# Patient Record
Sex: MALE | Race: WHITE | Employment: UNEMPLOYED | URBAN - METROPOLITAN AREA
[De-identification: names, ages, dates, MRNs, and addresses within clinical notes are randomized per-mention and may not be internally consistent; named-entity substitution may affect disease eponyms.]

---

## 2022-04-26 ENCOUNTER — TELEPHONE (OUTPATIENT)
Dept: BARIATRICS/WEIGHT MGMT | Age: 77
End: 2022-04-26

## 2022-04-26 NOTE — TELEPHONE ENCOUNTER
Just an FYI. .. We received a referral from Dr Tammy Alexander. We have attempted to contact pt 2x, have left messages for him to call office. .Pt does not have any other contacts listing in his chart.  Unable to reach him

## 2022-04-28 ENCOUNTER — INITIAL CONSULT (OUTPATIENT)
Dept: BARIATRICS/WEIGHT MGMT | Age: 77
End: 2022-04-28
Payer: MEDICARE

## 2022-04-28 VITALS
HEART RATE: 89 BPM | SYSTOLIC BLOOD PRESSURE: 149 MMHG | DIASTOLIC BLOOD PRESSURE: 77 MMHG | BODY MASS INDEX: 26.16 KG/M2 | WEIGHT: 157 LBS | HEIGHT: 65 IN

## 2022-04-28 DIAGNOSIS — Z99.2 ESRD (END STAGE RENAL DISEASE) ON DIALYSIS (HCC): Primary | ICD-10-CM

## 2022-04-28 DIAGNOSIS — N18.6 ESRD (END STAGE RENAL DISEASE) ON DIALYSIS (HCC): Primary | ICD-10-CM

## 2022-04-28 PROCEDURE — 99204 OFFICE O/P NEW MOD 45 MIN: CPT | Performed by: SURGERY

## 2022-04-28 PROCEDURE — 1123F ACP DISCUSS/DSCN MKR DOCD: CPT | Performed by: SURGERY

## 2022-04-28 PROCEDURE — 1036F TOBACCO NON-USER: CPT | Performed by: SURGERY

## 2022-04-28 PROCEDURE — G8417 CALC BMI ABV UP PARAM F/U: HCPCS | Performed by: SURGERY

## 2022-04-28 PROCEDURE — 3017F COLORECTAL CA SCREEN DOC REV: CPT | Performed by: SURGERY

## 2022-04-28 PROCEDURE — G8427 DOCREV CUR MEDS BY ELIG CLIN: HCPCS | Performed by: SURGERY

## 2022-04-28 PROCEDURE — 4040F PNEUMOC VAC/ADMIN/RCVD: CPT | Performed by: SURGERY

## 2022-04-28 NOTE — PROGRESS NOTES
[facility]) Physicians   General & Laparoscopic Surgery  [employer]       HPI:  Abdelrahman Garland is a very pleasant 77 y.o. male who is referred by Dr. Gin Browning nephrologist for consultation with regards PD catheter placement    Patient has ESRD requiring dialysis and prefers PD over HD    History of kidney transplant 8 years ago, has been on HD for last year      Past Medical History:   Diagnosis Date    Cancer [facility])     prostate    Chronic kidney disease     Hyperlipidemia     Hypertension, essential      Past Surgical History:   Procedure Laterality Date    CATARACT REMOVAL      HIP SURGERY      KIDNEY TRANSPLANT      PROSTATE SURGERY      TONSILLECTOMY      WISDOM TOOTH EXTRACTION       History reviewed. No pertinent family history. Social History     Tobacco Use    Smoking status: Former Smoker    Smokeless tobacco: Never Used   Substance Use Topics    Alcohol use: Never     I counseled the patient on the importance of not smoking and risks of ETOH. No Known Allergies  Vitals:    04/28/22 1029   BP: (!) 149/77   Pulse: 89   Weight: 157 lb (71.2 kg)   Height: 5' 5\" (1.651 m)       Body mass index is 26.13 kg/m².       Current Outpatient Medications:     Tacrolimus (PROGRAF) 0.2 MG PACK, Take by mouth, Disp: , Rfl:     escitalopram (LEXAPRO) 20 MG tablet, Take 20 mg by mouth daily, Disp: , Rfl:     atorvastatin (LIPITOR) 40 MG tablet, Take 40 mg by mouth daily, Disp: , Rfl:     carvedilol (COREG) 6.25 MG tablet, Take 6.25 mg by mouth 2 times daily (with meals), Disp: , Rfl:     sevelamer (RENVELA) 800 MG tablet, Take 1 tablet by mouth 2 times daily , Disp: , Rfl:     calcium carbonate (TUMS) 500 MG chewable tablet, Take 1 tablet by mouth daily, Disp: , Rfl:     predniSONE (DELTASONE) 2.5 MG tablet, Take 1 tablet by mouth daily (Patient taking differently: Take 2.5 mg by mouth daily Half pill day), Disp: 30 tablet, Rfl: 5    ROS  Review of Systems - History obtained from the patient  General ROS: negative  Psychological ROS: negative  Ophthalmic ROS: negative  Neurological ROS: negative  ENT ROS: negative  Allergy and Immunology ROS: negative  Hematological and Lymphatic ROS: negative  Endocrine ROS: negative  Respiratory ROS: negative  Cardiovascular ROS: negative  Gastrointestinal ROS:negative  Genito-Urinary ROS: negative  Musculoskeletal ROS: negative   Skin ROS:negative        Physical Exam   Constitutional: Patient is oriented to person, place, and time. Vital signs are normal. Patient  appears well-developed and well-nourished. Patient  is active and cooperative. Non-toxic appearance. No distress. HENT:   Head: Normocephalic and atraumatic. Head is without laceration. Right Ear: External ear normal. No lacerations. No drainage, swelling or tenderness. Left Ear: External ear normal. No lacerations. No drainage, swelling or tenderness. Nose: Nose normal. No nose lacerations or nasal deformity. Mouth/Throat: Uvula is midline, oropharynx is clear and moist and mucous membranes are normal. No oropharyngeal exudate. Eyes: Conjunctivae, EOM and lids are normal. Pupils are equal, round, and reactive to light. Right eye exhibits no discharge. No foreign body present in the right eye. Left eye exhibits no discharge. No foreign body present in the left eye. No scleral icterus. Neck: Trachea normal and normal range of motion. Neck supple. No JVD present. No tracheal tenderness present. Carotid bruit is not present. No rigidity. No tracheal deviation and no edema present. No thyromegaly present. Cardiovascular: Normal rate, regular rhythm, normal heart sounds, intact distal pulses and normal pulses. Pulmonary/Chest: Effort normal and breath sounds normal. No stridor. No respiratory distress. Patient  has no wheezes. Patient has no rales. Patient exhibits no tenderness and no crepitus. Abdominal: Soft.  Normal appearance and bowel sounds are normal. Patient exhibits no distension, no abdominal bruit, no ascites and no mass. There is no hepatosplenomegaly. There is no tenderness. There is no rigidity, no rebound, no guarding and no CVA tenderness. No hernia. Hernia confirmed negative in the ventral area. Musculoskeletal: Normal range of motion. Patient exhibits no edema or tenderness. Lymphadenopathy:        Head (right side): No submental, no submandibular, no preauricular, no posterior auricular and no occipital adenopathy present. Head (left side): No submental, no submandibular, no preauricular, no posterior auricular and no occipital adenopathy present. Patient  has no cervical adenopathy. Right: No supraclavicular adenopathy present. Left: No supraclavicular adenopathy present. Neurological: Patient is alert and oriented to person, place, and time. Patient has normal strength. Coordination and gait normal. GCS eye subscore is 4. GCS verbal subscore is 5. GCS motor subscore is 6. Skin: Skin is warm and dry. No abrasion and no rash noted. Patient  is not diaphoretic. No cyanosis or erythema. Psychiatric: Patient has a normal mood and affect. speech is normal and behavior is normal. Cognition and memory are normal.       Radiology:  I have personally reviewed the radiological studies and discussed it with the patient. A/P:  Adama Hrarington is a very pleasant 77 y.o. male with symptomatic CKD stage 5 requiring dialysis    Patient prefers Lap PD cath placement    Risks and benefits explained and informed consent obtained. Risks include but not limited to; The possibilities of reaction to medication, pain, infection, bleeding, heart attack, death, injury to internal organs, seroma, chronic pain, nerve injury, open wound , scarring or need for further surgery. 1- Pre-op work up ordered. 2- Laparoscopic PD catheter placement  3- F/U with PCP for pre-op physical and Medical Clearance.    4- Will need to be off blood thinners for surgery for at least one week, , please discuss with your PCP or cardiologist.      Saul English

## 2022-06-06 ENCOUNTER — TELEPHONE (OUTPATIENT)
Dept: BARIATRICS/WEIGHT MGMT | Age: 77
End: 2022-06-06

## 2022-06-06 NOTE — TELEPHONE ENCOUNTER
Pt is scheduled for a Lap PD cath placement on 6/14/22. Pt's wife called today to let us know he has considered his options and does not wish to proceed with PD, and wishes to continue with HD at this time and requested his surgery to be cancelled.
Thank you
General